# Patient Record
Sex: MALE | Race: OTHER | NOT HISPANIC OR LATINO | Employment: FULL TIME | URBAN - METROPOLITAN AREA
[De-identification: names, ages, dates, MRNs, and addresses within clinical notes are randomized per-mention and may not be internally consistent; named-entity substitution may affect disease eponyms.]

---

## 2023-03-04 ENCOUNTER — OFFICE VISIT (OUTPATIENT)
Dept: URGENT CARE | Facility: MEDICAL CENTER | Age: 33
End: 2023-03-04

## 2023-03-04 ENCOUNTER — APPOINTMENT (OUTPATIENT)
Dept: RADIOLOGY | Facility: MEDICAL CENTER | Age: 33
End: 2023-03-04
Attending: PHYSICIAN ASSISTANT

## 2023-03-04 VITALS
WEIGHT: 240 LBS | SYSTOLIC BLOOD PRESSURE: 110 MMHG | RESPIRATION RATE: 20 BRPM | HEIGHT: 74 IN | DIASTOLIC BLOOD PRESSURE: 90 MMHG | OXYGEN SATURATION: 98 % | TEMPERATURE: 97.8 F | HEART RATE: 68 BPM | BODY MASS INDEX: 30.8 KG/M2

## 2023-03-04 DIAGNOSIS — R55 VASOVAGAL EPISODE: ICD-10-CM

## 2023-03-04 DIAGNOSIS — S89.91XA KNEE INJURY, RIGHT, INITIAL ENCOUNTER: ICD-10-CM

## 2023-03-04 DIAGNOSIS — S82.141A CLOSED FRACTURE OF RIGHT TIBIAL PLATEAU, INITIAL ENCOUNTER: Primary | ICD-10-CM

## 2023-03-04 RX ORDER — IBUPROFEN 600 MG/1
600 TABLET ORAL ONCE
Status: COMPLETED | OUTPATIENT
Start: 2023-03-04 | End: 2023-03-04

## 2023-03-04 RX ADMIN — IBUPROFEN 600 MG: 600 TABLET ORAL at 11:52

## 2023-03-04 NOTE — PROGRESS NOTES
Knee immobilizer attempted to be p[laced as patient stood  He became faint, diap[horetic and pale and fell back into the chair  Patient was more alert after a few seconds  Lillie Landa states he has having a lot of pain  He was sitting in the chair  BP 60/40  water given to patient  Reynold again had a near syncopal episode in the chair  Monroe Maciel to the bedside  Patient moved to another room to lay down    He was given orange jusie and His BP up to 100/60 HR 88

## 2023-03-04 NOTE — PROGRESS NOTES
[facility]        NAME: Brielle Triplett is a 28 y o  male  : 1990    MRN: [id number]  DATE: 2023  TIME: 11:47 AM    Assessment and Plan   Closed fracture of right tibial plateau, initial encounter [S82 141A]  1  Closed fracture of right tibial plateau, initial encounter  Ambulatory Referral to Orthopedic Surgery    ibuprofen (MOTRIN) tablet 600 mg      2  Knee injury, right, initial encounter  XR knee 4+ vw right injury      3  Vasovagal episode              Patient Instructions       Follow up with orthopedics  Referral made  X-ray read by me as lateral tibial plateau fracture  Nonweightbearing crutches and knee immobilizer  Chief Complaint     Chief Complaint   Patient presents with   • Knee Pain     Patient was playing basketball today and came down on his leg and he felt a pop  He states he is having pain and swelling in his R knee  No pain medications taken         History of Present Illness       Knee Pain         Review of Systems   Review of Systems   All other systems reviewed and are negative  Current Medications     No current outpatient medications on file  Current Facility-Administered Medications:   •  ibuprofen (MOTRIN) tablet 600 mg, 600 mg, Oral, Once, Terrence Vivar PA-C    Current Allergies     Allergies as of 2023   • (No Known Allergies)            The following portions of the patient's history were reviewed and updated as appropriate: allergies, current medications, past family history, past medical history, past social history, past surgical history and problem list      History reviewed  No pertinent past medical history  History reviewed  No pertinent surgical history  History reviewed  No pertinent family history  Medications have been verified          Objective   /90   Pulse 68   Temp 97 8 °F (36 6 °C)   Resp 20   Ht 6' 2" (1 88 m)   Wt 109 kg (240 lb) Comment: per pt  SpO2 98%   BMI 30 81 kg/m²   No LMP for male patient  Physical Exam     Physical Exam  Vitals and nursing note reviewed  Constitutional:       Appearance: Normal appearance  He is normal weight  Cardiovascular:      Rate and Rhythm: Normal rate and regular rhythm  Pulses: Normal pulses  Heart sounds: Normal heart sounds  Pulmonary:      Effort: Pulmonary effort is normal       Breath sounds: Normal breath sounds  Neurological:      Mental Status: He is alert  Right knee positive effusion positive tenderness over the tibial plateau laterally